# Patient Record
Sex: MALE | Race: WHITE
[De-identification: names, ages, dates, MRNs, and addresses within clinical notes are randomized per-mention and may not be internally consistent; named-entity substitution may affect disease eponyms.]

---

## 2018-08-12 ENCOUNTER — HOSPITAL ENCOUNTER (INPATIENT)
Dept: HOSPITAL 15 - ER | Age: 73
LOS: 3 days | Discharge: HOME HEALTH | DRG: 280 | End: 2018-08-15
Attending: INTERNAL MEDICINE | Admitting: INTERNAL MEDICINE
Payer: COMMERCIAL

## 2018-08-12 VITALS — DIASTOLIC BLOOD PRESSURE: 96 MMHG | SYSTOLIC BLOOD PRESSURE: 153 MMHG

## 2018-08-12 VITALS — WEIGHT: 217.16 LBS | HEIGHT: 72 IN | BODY MASS INDEX: 29.41 KG/M2

## 2018-08-12 DIAGNOSIS — Z72.0: ICD-10-CM

## 2018-08-12 DIAGNOSIS — N18.3: ICD-10-CM

## 2018-08-12 DIAGNOSIS — I21.4: ICD-10-CM

## 2018-08-12 DIAGNOSIS — Z91.19: ICD-10-CM

## 2018-08-12 DIAGNOSIS — N17.9: ICD-10-CM

## 2018-08-12 DIAGNOSIS — F10.20: ICD-10-CM

## 2018-08-12 DIAGNOSIS — I42.0: ICD-10-CM

## 2018-08-12 DIAGNOSIS — I50.43: ICD-10-CM

## 2018-08-12 DIAGNOSIS — I13.0: Primary | ICD-10-CM

## 2018-08-12 DIAGNOSIS — Z82.49: ICD-10-CM

## 2018-08-12 DIAGNOSIS — N28.1: ICD-10-CM

## 2018-08-12 DIAGNOSIS — I25.5: ICD-10-CM

## 2018-08-12 LAB
ALBUMIN SERPL-MCNC: 2.8 G/DL (ref 3.4–5)
ALP SERPL-CCNC: 84 U/L (ref 45–117)
ALT SERPL-CCNC: 33 U/L (ref 16–61)
AMYLASE SERPL-CCNC: 67 U/L (ref 25–115)
ANION GAP SERPL CALCULATED.3IONS-SCNC: 10 MMOL/L (ref 5–15)
APTT PPP: 28 SEC (ref 23.78–33.04)
BILIRUB SERPL-MCNC: 0.5 MG/DL (ref 0.2–1)
BUN SERPL-MCNC: 35 MG/DL (ref 7–18)
BUN/CREAT SERPL: 17.6
CALCIUM SERPL-MCNC: 8.2 MG/DL (ref 8.5–10.1)
CHLORIDE SERPL-SCNC: 106 MMOL/L (ref 98–107)
CO2 SERPL-SCNC: 25 MMOL/L (ref 21–32)
GLUCOSE SERPL-MCNC: 103 MG/DL (ref 74–106)
HCT VFR BLD AUTO: 40.3 % (ref 41–53)
HGB BLD-MCNC: 14 G/DL (ref 13.5–17.5)
INR PPP: 1 (ref 0.9–1.15)
LIPASE SERPL-CCNC: 62 U/L (ref 73–393)
MAGNESIUM SERPL-MCNC: 2.7 MG/DL (ref 1.6–2.6)
MCH RBC QN AUTO: 35.4 PG (ref 28–32)
MCV RBC AUTO: 102.2 FL (ref 80–100)
NRBC BLD QL AUTO: 0.1 %
POTASSIUM SERPL-SCNC: 4.2 MMOL/L (ref 3.5–5.1)
PROT SERPL-MCNC: 6.6 G/DL (ref 6.4–8.2)
PROTHROMBIN TIME: 10.7 SEC (ref 9.27–12.13)
SODIUM SERPL-SCNC: 141 MMOL/L (ref 136–145)

## 2018-08-12 PROCEDURE — 96372 THER/PROPH/DIAG INJ SC/IM: CPT

## 2018-08-12 PROCEDURE — 93306 TTE W/DOPPLER COMPLETE: CPT

## 2018-08-12 PROCEDURE — 81001 URINALYSIS AUTO W/SCOPE: CPT

## 2018-08-12 PROCEDURE — 82550 ASSAY OF CK (CPK): CPT

## 2018-08-12 PROCEDURE — 94761 N-INVAS EAR/PLS OXIMETRY MLT: CPT

## 2018-08-12 PROCEDURE — 83880 ASSAY OF NATRIURETIC PEPTIDE: CPT

## 2018-08-12 PROCEDURE — 80053 COMPREHEN METABOLIC PANEL: CPT

## 2018-08-12 PROCEDURE — 86141 C-REACTIVE PROTEIN HS: CPT

## 2018-08-12 PROCEDURE — 76775 US EXAM ABDO BACK WALL LIM: CPT

## 2018-08-12 PROCEDURE — 51702 INSERT TEMP BLADDER CATH: CPT

## 2018-08-12 PROCEDURE — 93005 ELECTROCARDIOGRAM TRACING: CPT

## 2018-08-12 PROCEDURE — 84443 ASSAY THYROID STIM HORMONE: CPT

## 2018-08-12 PROCEDURE — 83690 ASSAY OF LIPASE: CPT

## 2018-08-12 PROCEDURE — 80307 DRUG TEST PRSMV CHEM ANLYZR: CPT

## 2018-08-12 PROCEDURE — 85025 COMPLETE CBC W/AUTO DIFF WBC: CPT

## 2018-08-12 PROCEDURE — 85610 PROTHROMBIN TIME: CPT

## 2018-08-12 PROCEDURE — 71045 X-RAY EXAM CHEST 1 VIEW: CPT

## 2018-08-12 PROCEDURE — 96375 TX/PRO/DX INJ NEW DRUG ADDON: CPT

## 2018-08-12 PROCEDURE — 85730 THROMBOPLASTIN TIME PARTIAL: CPT

## 2018-08-12 PROCEDURE — 96374 THER/PROPH/DIAG INJ IV PUSH: CPT

## 2018-08-12 PROCEDURE — 85379 FIBRIN DEGRADATION QUANT: CPT

## 2018-08-12 PROCEDURE — 36415 COLL VENOUS BLD VENIPUNCTURE: CPT

## 2018-08-12 PROCEDURE — 85652 RBC SED RATE AUTOMATED: CPT

## 2018-08-12 PROCEDURE — 84484 ASSAY OF TROPONIN QUANT: CPT

## 2018-08-12 PROCEDURE — 83735 ASSAY OF MAGNESIUM: CPT

## 2018-08-12 PROCEDURE — 82150 ASSAY OF AMYLASE: CPT

## 2018-08-12 PROCEDURE — 80048 BASIC METABOLIC PNL TOTAL CA: CPT

## 2018-08-12 PROCEDURE — 80061 LIPID PANEL: CPT

## 2018-08-12 PROCEDURE — 74176 CT ABD & PELVIS W/O CONTRAST: CPT

## 2018-08-12 RX ADMIN — CARVEDILOL SCH MG: 3.12 TABLET, FILM COATED ORAL at 23:02

## 2018-08-12 RX ADMIN — ATORVASTATIN CALCIUM SCH MG: 20 TABLET, FILM COATED ORAL at 23:02

## 2018-08-12 RX ADMIN — ENOXAPARIN SODIUM SCH MG: 40 INJECTION SUBCUTANEOUS at 19:15

## 2018-08-12 RX ADMIN — SODIUM CHLORIDE SCH ML: 9 INJECTION INTRAMUSCULAR; INTRAVENOUS; SUBCUTANEOUS at 23:01

## 2018-08-13 VITALS — DIASTOLIC BLOOD PRESSURE: 95 MMHG | SYSTOLIC BLOOD PRESSURE: 155 MMHG

## 2018-08-13 VITALS — DIASTOLIC BLOOD PRESSURE: 121 MMHG | SYSTOLIC BLOOD PRESSURE: 159 MMHG

## 2018-08-13 VITALS — SYSTOLIC BLOOD PRESSURE: 135 MMHG | DIASTOLIC BLOOD PRESSURE: 56 MMHG

## 2018-08-13 VITALS — DIASTOLIC BLOOD PRESSURE: 100 MMHG | SYSTOLIC BLOOD PRESSURE: 142 MMHG

## 2018-08-13 VITALS — DIASTOLIC BLOOD PRESSURE: 73 MMHG | SYSTOLIC BLOOD PRESSURE: 118 MMHG

## 2018-08-13 VITALS — SYSTOLIC BLOOD PRESSURE: 159 MMHG | DIASTOLIC BLOOD PRESSURE: 98 MMHG

## 2018-08-13 LAB
ALBUMIN SERPL-MCNC: 2.5 G/DL (ref 3.4–5)
ALCOHOL, URINE: < 3 MG/DL (ref 0–5)
ALP SERPL-CCNC: 65 U/L (ref 45–117)
ALT SERPL-CCNC: 28 U/L (ref 16–61)
AMPHETAMINES UR QL SCN: NEGATIVE
ANION GAP SERPL CALCULATED.3IONS-SCNC: 10 MMOL/L (ref 5–15)
BARBITURATES UR QL SCN: NEGATIVE
BENZODIAZ UR QL SCN: NEGATIVE
BILIRUB SERPL-MCNC: 0.4 MG/DL (ref 0.2–1)
BUN SERPL-MCNC: 38 MG/DL (ref 7–18)
BUN/CREAT SERPL: 18.7
BZE UR QL SCN: NEGATIVE
CALCIUM SERPL-MCNC: 7.6 MG/DL (ref 8.5–10.1)
CANNABINOIDS UR QL SCN: NEGATIVE
CHLORIDE SERPL-SCNC: 106 MMOL/L (ref 98–107)
CHOLEST SERPL-MCNC: 152 MG/DL (ref ?–200)
CO2 SERPL-SCNC: 26 MMOL/L (ref 21–32)
GLUCOSE SERPL-MCNC: 95 MG/DL (ref 74–106)
HDLC SERPL-MCNC: 48 MG/DL (ref 40–59)
OPIATES UR QL SCN: NEGATIVE
PCP UR QL SCN: NEGATIVE
POTASSIUM SERPL-SCNC: 3.4 MMOL/L (ref 3.5–5.1)
PROT SERPL-MCNC: 5.8 G/DL (ref 6.4–8.2)
SODIUM SERPL-SCNC: 142 MMOL/L (ref 136–145)
TRIGL SERPL-MCNC: 99 MG/DL (ref ?–150)

## 2018-08-13 RX ADMIN — SODIUM CHLORIDE SCH ML: 9 INJECTION INTRAMUSCULAR; INTRAVENOUS; SUBCUTANEOUS at 14:42

## 2018-08-13 RX ADMIN — POTASSIUM CHLORIDE SCH MEQ: 1500 TABLET, EXTENDED RELEASE ORAL at 10:58

## 2018-08-13 RX ADMIN — SODIUM CHLORIDE SCH MG: 9 INJECTION, SOLUTION INTRAVENOUS at 10:57

## 2018-08-13 RX ADMIN — FOLIC ACID SCH MG: 1 TABLET ORAL at 15:20

## 2018-08-13 RX ADMIN — SODIUM CHLORIDE SCH ML: 9 INJECTION INTRAMUSCULAR; INTRAVENOUS; SUBCUTANEOUS at 21:24

## 2018-08-13 RX ADMIN — ENOXAPARIN SODIUM SCH MG: 40 INJECTION SUBCUTANEOUS at 10:57

## 2018-08-13 RX ADMIN — ATORVASTATIN CALCIUM SCH MG: 20 TABLET, FILM COATED ORAL at 21:25

## 2018-08-13 RX ADMIN — CARVEDILOL SCH MG: 3.12 TABLET, FILM COATED ORAL at 21:25

## 2018-08-13 RX ADMIN — FUROSEMIDE SCH MG: 10 INJECTION, SOLUTION INTRAMUSCULAR; INTRAVENOUS at 10:56

## 2018-08-13 RX ADMIN — CARVEDILOL SCH MG: 3.12 TABLET, FILM COATED ORAL at 10:59

## 2018-08-13 RX ADMIN — PANTOPRAZOLE SODIUM SCH MG: 40 TABLET, DELAYED RELEASE ORAL at 10:59

## 2018-08-13 RX ADMIN — NITROGLYCERIN SCH PATCH: 0.2 PATCH TRANSDERMAL at 10:55

## 2018-08-13 RX ADMIN — SODIUM CHLORIDE SCH ML: 9 INJECTION INTRAMUSCULAR; INTRAVENOUS; SUBCUTANEOUS at 06:10

## 2018-08-13 RX ADMIN — PROMETHAZINE HYDROCHLORIDE PRN MG: 25 INJECTION INTRAMUSCULAR; INTRAVENOUS at 21:26

## 2018-08-14 VITALS — DIASTOLIC BLOOD PRESSURE: 98 MMHG | SYSTOLIC BLOOD PRESSURE: 158 MMHG

## 2018-08-14 VITALS — SYSTOLIC BLOOD PRESSURE: 161 MMHG | DIASTOLIC BLOOD PRESSURE: 98 MMHG

## 2018-08-14 VITALS — DIASTOLIC BLOOD PRESSURE: 68 MMHG | SYSTOLIC BLOOD PRESSURE: 109 MMHG

## 2018-08-14 VITALS — DIASTOLIC BLOOD PRESSURE: 98 MMHG | SYSTOLIC BLOOD PRESSURE: 155 MMHG

## 2018-08-14 VITALS — SYSTOLIC BLOOD PRESSURE: 169 MMHG | DIASTOLIC BLOOD PRESSURE: 107 MMHG

## 2018-08-14 LAB
ANION GAP SERPL CALCULATED.3IONS-SCNC: 8 MMOL/L (ref 5–15)
BUN SERPL-MCNC: 34 MG/DL (ref 7–18)
BUN/CREAT SERPL: 18.1
CALCIUM SERPL-MCNC: 7.5 MG/DL (ref 8.5–10.1)
CHLORIDE SERPL-SCNC: 106 MMOL/L (ref 98–107)
CO2 SERPL-SCNC: 30 MMOL/L (ref 21–32)
GLUCOSE SERPL-MCNC: 83 MG/DL (ref 74–106)
HCT VFR BLD AUTO: 35.5 % (ref 41–53)
HGB BLD-MCNC: 12.3 G/DL (ref 13.5–17.5)
MAGNESIUM SERPL-MCNC: 2.2 MG/DL (ref 1.6–2.6)
MCH RBC QN AUTO: 35.5 PG (ref 28–32)
MCV RBC AUTO: 102.4 FL (ref 80–100)
NRBC BLD QL AUTO: 0.1 %
POTASSIUM SERPL-SCNC: 3.2 MMOL/L (ref 3.5–5.1)
SODIUM SERPL-SCNC: 144 MMOL/L (ref 136–145)

## 2018-08-14 RX ADMIN — SODIUM CHLORIDE SCH ML: 9 INJECTION INTRAMUSCULAR; INTRAVENOUS; SUBCUTANEOUS at 05:45

## 2018-08-14 RX ADMIN — FOLIC ACID SCH MG: 1 TABLET ORAL at 08:56

## 2018-08-14 RX ADMIN — PROMETHAZINE HYDROCHLORIDE PRN MG: 25 INJECTION INTRAMUSCULAR; INTRAVENOUS at 15:49

## 2018-08-14 RX ADMIN — FUROSEMIDE SCH MG: 10 INJECTION, SOLUTION INTRAMUSCULAR; INTRAVENOUS at 08:56

## 2018-08-14 RX ADMIN — POTASSIUM CHLORIDE SCH MEQ: 1500 TABLET, EXTENDED RELEASE ORAL at 08:56

## 2018-08-14 RX ADMIN — NITROGLYCERIN SCH PATCH: 0.2 PATCH TRANSDERMAL at 10:00

## 2018-08-14 RX ADMIN — ATORVASTATIN CALCIUM SCH MG: 20 TABLET, FILM COATED ORAL at 21:44

## 2018-08-14 RX ADMIN — SODIUM CHLORIDE SCH ML: 9 INJECTION INTRAMUSCULAR; INTRAVENOUS; SUBCUTANEOUS at 15:49

## 2018-08-14 RX ADMIN — ENOXAPARIN SODIUM SCH MG: 40 INJECTION SUBCUTANEOUS at 08:58

## 2018-08-14 RX ADMIN — SODIUM CHLORIDE SCH MG: 9 INJECTION, SOLUTION INTRAVENOUS at 08:58

## 2018-08-14 RX ADMIN — SODIUM CHLORIDE SCH ML: 9 INJECTION INTRAMUSCULAR; INTRAVENOUS; SUBCUTANEOUS at 21:45

## 2018-08-14 RX ADMIN — CARVEDILOL SCH MG: 3.12 TABLET, FILM COATED ORAL at 08:57

## 2018-08-14 RX ADMIN — PANTOPRAZOLE SODIUM SCH MG: 40 TABLET, DELAYED RELEASE ORAL at 08:57

## 2018-08-14 RX ADMIN — CARVEDILOL SCH MG: 3.12 TABLET, FILM COATED ORAL at 21:45

## 2018-08-15 VITALS — DIASTOLIC BLOOD PRESSURE: 87 MMHG | SYSTOLIC BLOOD PRESSURE: 134 MMHG

## 2018-08-15 VITALS — DIASTOLIC BLOOD PRESSURE: 104 MMHG | SYSTOLIC BLOOD PRESSURE: 168 MMHG

## 2018-08-15 VITALS — SYSTOLIC BLOOD PRESSURE: 142 MMHG | DIASTOLIC BLOOD PRESSURE: 92 MMHG

## 2018-08-15 VITALS — SYSTOLIC BLOOD PRESSURE: 143 MMHG | DIASTOLIC BLOOD PRESSURE: 79 MMHG

## 2018-08-15 VITALS — DIASTOLIC BLOOD PRESSURE: 79 MMHG | SYSTOLIC BLOOD PRESSURE: 143 MMHG

## 2018-08-15 LAB
ANION GAP SERPL CALCULATED.3IONS-SCNC: 7 MMOL/L (ref 5–15)
BUN SERPL-MCNC: 33 MG/DL (ref 7–18)
BUN/CREAT SERPL: 19.3
CALCIUM SERPL-MCNC: 7.5 MG/DL (ref 8.5–10.1)
CHLORIDE SERPL-SCNC: 104 MMOL/L (ref 98–107)
CO2 SERPL-SCNC: 31 MMOL/L (ref 21–32)
GLUCOSE SERPL-MCNC: 82 MG/DL (ref 74–106)
HCT VFR BLD AUTO: 36.3 % (ref 41–53)
HGB BLD-MCNC: 12.3 G/DL (ref 13.5–17.5)
MAGNESIUM SERPL-MCNC: 2.5 MG/DL (ref 1.6–2.6)
MCH RBC QN AUTO: 34.9 PG (ref 28–32)
MCV RBC AUTO: 103 FL (ref 80–100)
NRBC BLD QL AUTO: 0 %
POTASSIUM SERPL-SCNC: 3.4 MMOL/L (ref 3.5–5.1)
SODIUM SERPL-SCNC: 142 MMOL/L (ref 136–145)

## 2018-08-15 RX ADMIN — CARVEDILOL SCH MG: 3.12 TABLET, FILM COATED ORAL at 10:00

## 2018-08-15 RX ADMIN — FOLIC ACID SCH MG: 1 TABLET ORAL at 10:00

## 2018-08-15 RX ADMIN — SODIUM CHLORIDE SCH ML: 9 INJECTION INTRAMUSCULAR; INTRAVENOUS; SUBCUTANEOUS at 05:45

## 2018-08-15 RX ADMIN — POTASSIUM CHLORIDE SCH MEQ: 1500 TABLET, EXTENDED RELEASE ORAL at 10:00

## 2018-08-15 RX ADMIN — PANTOPRAZOLE SODIUM SCH MG: 40 TABLET, DELAYED RELEASE ORAL at 10:00

## 2018-08-15 RX ADMIN — SODIUM CHLORIDE SCH ML: 9 INJECTION INTRAMUSCULAR; INTRAVENOUS; SUBCUTANEOUS at 14:00

## 2018-08-15 RX ADMIN — NITROGLYCERIN SCH PATCH: 0.2 PATCH TRANSDERMAL at 10:00

## 2018-08-15 RX ADMIN — SODIUM CHLORIDE SCH MG: 9 INJECTION, SOLUTION INTRAVENOUS at 10:00

## 2018-08-15 RX ADMIN — FUROSEMIDE SCH MG: 10 INJECTION, SOLUTION INTRAMUSCULAR; INTRAVENOUS at 10:00

## 2018-08-15 RX ADMIN — ENOXAPARIN SODIUM SCH MG: 40 INJECTION SUBCUTANEOUS at 10:00

## 2018-09-23 ENCOUNTER — HOSPITAL ENCOUNTER (INPATIENT)
Dept: HOSPITAL 15 - ER | Age: 73
LOS: 2 days | Discharge: HOME HEALTH | DRG: 291 | End: 2018-09-25
Attending: INTERNAL MEDICINE | Admitting: NURSE PRACTITIONER
Payer: COMMERCIAL

## 2018-09-23 VITALS — BODY MASS INDEX: 29.65 KG/M2 | WEIGHT: 218.92 LBS | HEIGHT: 72 IN

## 2018-09-23 DIAGNOSIS — I50.43: ICD-10-CM

## 2018-09-23 DIAGNOSIS — N18.3: ICD-10-CM

## 2018-09-23 DIAGNOSIS — J96.01: ICD-10-CM

## 2018-09-23 DIAGNOSIS — I13.0: Primary | ICD-10-CM

## 2018-09-23 DIAGNOSIS — Z82.49: ICD-10-CM

## 2018-09-23 DIAGNOSIS — N17.9: ICD-10-CM

## 2018-09-23 DIAGNOSIS — Z91.14: ICD-10-CM

## 2018-09-23 DIAGNOSIS — Z79.899: ICD-10-CM

## 2018-09-23 DIAGNOSIS — I16.1: ICD-10-CM

## 2018-09-23 DIAGNOSIS — F51.04: ICD-10-CM

## 2018-09-23 DIAGNOSIS — F10.10: ICD-10-CM

## 2018-09-23 DIAGNOSIS — Z91.19: ICD-10-CM

## 2018-09-23 DIAGNOSIS — I42.9: ICD-10-CM

## 2018-09-23 DIAGNOSIS — E78.5: ICD-10-CM

## 2018-09-23 DIAGNOSIS — I08.3: ICD-10-CM

## 2018-09-23 DIAGNOSIS — I50.82: ICD-10-CM

## 2018-09-23 LAB
ALBUMIN SERPL-MCNC: 2.4 G/DL (ref 3.4–5)
ALP SERPL-CCNC: 75 U/L (ref 45–117)
ALT SERPL-CCNC: 14 U/L (ref 16–61)
ANION GAP SERPL CALCULATED.3IONS-SCNC: 6 MMOL/L (ref 5–15)
APTT PPP: 29.5 SEC (ref 23.78–33.04)
BILIRUB SERPL-MCNC: 0.3 MG/DL (ref 0.2–1)
BUN SERPL-MCNC: 18 MG/DL (ref 7–18)
BUN/CREAT SERPL: 11.6
CALCIUM SERPL-MCNC: 7.5 MG/DL (ref 8.5–10.1)
CHLORIDE SERPL-SCNC: 112 MMOL/L (ref 98–107)
CO2 SERPL-SCNC: 21 MMOL/L (ref 21–32)
GLUCOSE SERPL-MCNC: 111 MG/DL (ref 74–106)
HCT VFR BLD AUTO: 29.1 % (ref 41–53)
HGB BLD-MCNC: 9.9 G/DL (ref 13.5–17.5)
INR PPP: 0.97 (ref 0.9–1.15)
MCH RBC QN AUTO: 35.3 PG (ref 28–32)
MCV RBC AUTO: 103.1 FL (ref 80–100)
NRBC BLD QL AUTO: 0 %
POTASSIUM SERPL-SCNC: 3.7 MMOL/L (ref 3.5–5.1)
PROT SERPL-MCNC: 6.3 G/DL (ref 6.4–8.2)
PROTHROMBIN TIME: 10.4 SEC (ref 9.27–12.13)
SODIUM SERPL-SCNC: 139 MMOL/L (ref 136–145)

## 2018-09-23 PROCEDURE — 85025 COMPLETE CBC W/AUTO DIFF WBC: CPT

## 2018-09-23 PROCEDURE — 80053 COMPREHEN METABOLIC PANEL: CPT

## 2018-09-23 PROCEDURE — 84484 ASSAY OF TROPONIN QUANT: CPT

## 2018-09-23 PROCEDURE — 84443 ASSAY THYROID STIM HORMONE: CPT

## 2018-09-23 PROCEDURE — 81001 URINALYSIS AUTO W/SCOPE: CPT

## 2018-09-23 PROCEDURE — 96375 TX/PRO/DX INJ NEW DRUG ADDON: CPT

## 2018-09-23 PROCEDURE — 85730 THROMBOPLASTIN TIME PARTIAL: CPT

## 2018-09-23 PROCEDURE — 94640 AIRWAY INHALATION TREATMENT: CPT

## 2018-09-23 PROCEDURE — 83735 ASSAY OF MAGNESIUM: CPT

## 2018-09-23 PROCEDURE — 96374 THER/PROPH/DIAG INJ IV PUSH: CPT

## 2018-09-23 PROCEDURE — 83880 ASSAY OF NATRIURETIC PEPTIDE: CPT

## 2018-09-23 PROCEDURE — 80048 BASIC METABOLIC PNL TOTAL CA: CPT

## 2018-09-23 PROCEDURE — 85610 PROTHROMBIN TIME: CPT

## 2018-09-23 PROCEDURE — 71045 X-RAY EXAM CHEST 1 VIEW: CPT

## 2018-09-23 PROCEDURE — 93005 ELECTROCARDIOGRAM TRACING: CPT

## 2018-09-23 PROCEDURE — 36415 COLL VENOUS BLD VENIPUNCTURE: CPT

## 2018-09-24 VITALS — DIASTOLIC BLOOD PRESSURE: 85 MMHG | SYSTOLIC BLOOD PRESSURE: 119 MMHG

## 2018-09-24 VITALS — DIASTOLIC BLOOD PRESSURE: 78 MMHG | SYSTOLIC BLOOD PRESSURE: 122 MMHG

## 2018-09-24 VITALS — DIASTOLIC BLOOD PRESSURE: 80 MMHG | SYSTOLIC BLOOD PRESSURE: 136 MMHG

## 2018-09-24 VITALS — DIASTOLIC BLOOD PRESSURE: 68 MMHG | SYSTOLIC BLOOD PRESSURE: 136 MMHG

## 2018-09-24 VITALS — SYSTOLIC BLOOD PRESSURE: 136 MMHG | DIASTOLIC BLOOD PRESSURE: 88 MMHG

## 2018-09-24 VITALS — SYSTOLIC BLOOD PRESSURE: 98 MMHG | DIASTOLIC BLOOD PRESSURE: 86 MMHG

## 2018-09-24 VITALS — SYSTOLIC BLOOD PRESSURE: 161 MMHG | DIASTOLIC BLOOD PRESSURE: 98 MMHG

## 2018-09-24 LAB
ALBUMIN SERPL-MCNC: 2.3 G/DL (ref 3.4–5)
ALP SERPL-CCNC: 68 U/L (ref 45–117)
ALT SERPL-CCNC: 16 U/L (ref 16–61)
ANION GAP SERPL CALCULATED.3IONS-SCNC: 3 MMOL/L (ref 5–15)
BILIRUB SERPL-MCNC: 0.3 MG/DL (ref 0.2–1)
BUN SERPL-MCNC: 19 MG/DL (ref 7–18)
BUN/CREAT SERPL: 11.5
CALCIUM SERPL-MCNC: 7.3 MG/DL (ref 8.5–10.1)
CHLORIDE SERPL-SCNC: 111 MMOL/L (ref 98–107)
CO2 SERPL-SCNC: 24 MMOL/L (ref 21–32)
CRYSTALS UR QL AUTO: (no result) /HPF
GLUCOSE SERPL-MCNC: 82 MG/DL (ref 74–106)
HCT VFR BLD AUTO: 26.8 % (ref 41–53)
HGB BLD-MCNC: 9.1 G/DL (ref 13.5–17.5)
MCH RBC QN AUTO: 34.9 PG (ref 28–32)
MCV RBC AUTO: 103.2 FL (ref 80–100)
NRBC BLD QL AUTO: 0.1 %
POTASSIUM SERPL-SCNC: 3.9 MMOL/L (ref 3.5–5.1)
PROT SERPL-MCNC: 5.8 G/DL (ref 6.4–8.2)
SODIUM SERPL-SCNC: 138 MMOL/L (ref 136–145)

## 2018-09-24 RX ADMIN — IPRATROPIUM BROMIDE SCH MG: 0.5 SOLUTION RESPIRATORY (INHALATION) at 18:38

## 2018-09-24 RX ADMIN — ENOXAPARIN SODIUM SCH MG: 40 INJECTION SUBCUTANEOUS at 08:45

## 2018-09-24 RX ADMIN — CARVEDILOL SCH MG: 3.12 TABLET, FILM COATED ORAL at 08:00

## 2018-09-24 RX ADMIN — CARVEDILOL SCH MG: 3.12 TABLET, FILM COATED ORAL at 18:00

## 2018-09-24 RX ADMIN — ALBUTEROL SULFATE SCH MG: 2.5 SOLUTION RESPIRATORY (INHALATION) at 18:39

## 2018-09-24 RX ADMIN — PANTOPRAZOLE SODIUM SCH MG: 40 TABLET, DELAYED RELEASE ORAL at 06:19

## 2018-09-24 RX ADMIN — FUROSEMIDE SCH MG: 10 INJECTION, SOLUTION INTRAMUSCULAR; INTRAVENOUS at 18:00

## 2018-09-25 VITALS — DIASTOLIC BLOOD PRESSURE: 88 MMHG | SYSTOLIC BLOOD PRESSURE: 146 MMHG

## 2018-09-25 VITALS — SYSTOLIC BLOOD PRESSURE: 151 MMHG | DIASTOLIC BLOOD PRESSURE: 86 MMHG

## 2018-09-25 VITALS — DIASTOLIC BLOOD PRESSURE: 90 MMHG | SYSTOLIC BLOOD PRESSURE: 147 MMHG

## 2018-09-25 VITALS — DIASTOLIC BLOOD PRESSURE: 75 MMHG | SYSTOLIC BLOOD PRESSURE: 136 MMHG

## 2018-09-25 VITALS — SYSTOLIC BLOOD PRESSURE: 147 MMHG | DIASTOLIC BLOOD PRESSURE: 90 MMHG

## 2018-09-25 LAB
ANION GAP SERPL CALCULATED.3IONS-SCNC: 10 MMOL/L (ref 5–15)
APTT PPP: 29.2 SEC (ref 23.78–33.04)
BUN SERPL-MCNC: 20 MG/DL (ref 7–18)
BUN/CREAT SERPL: 12.3
CALCIUM SERPL-MCNC: 8 MG/DL (ref 8.5–10.1)
CHLORIDE SERPL-SCNC: 107 MMOL/L (ref 98–107)
CO2 SERPL-SCNC: 24 MMOL/L (ref 21–32)
GLUCOSE SERPL-MCNC: 84 MG/DL (ref 74–106)
HCT VFR BLD AUTO: 28.6 % (ref 41–53)
HGB BLD-MCNC: 9.7 G/DL (ref 13.5–17.5)
INR PPP: 0.96 (ref 0.9–1.15)
MAGNESIUM SERPL-MCNC: 2.1 MG/DL (ref 1.6–2.6)
MCH RBC QN AUTO: 34.8 PG (ref 28–32)
MCV RBC AUTO: 102.5 FL (ref 80–100)
NRBC BLD QL AUTO: 0 %
POTASSIUM SERPL-SCNC: 3.3 MMOL/L (ref 3.5–5.1)
PROTHROMBIN TIME: 10.3 SEC (ref 9.27–12.13)
SODIUM SERPL-SCNC: 141 MMOL/L (ref 136–145)

## 2018-09-25 RX ADMIN — IPRATROPIUM BROMIDE SCH MG: 0.5 SOLUTION RESPIRATORY (INHALATION) at 11:44

## 2018-09-25 RX ADMIN — IPRATROPIUM BROMIDE SCH MG: 0.5 SOLUTION RESPIRATORY (INHALATION) at 06:52

## 2018-09-25 RX ADMIN — FUROSEMIDE SCH MG: 10 INJECTION, SOLUTION INTRAMUSCULAR; INTRAVENOUS at 06:21

## 2018-09-25 RX ADMIN — ALBUTEROL SULFATE SCH MG: 2.5 SOLUTION RESPIRATORY (INHALATION) at 00:28

## 2018-09-25 RX ADMIN — ALBUTEROL SULFATE SCH MG: 2.5 SOLUTION RESPIRATORY (INHALATION) at 00:00

## 2018-09-25 RX ADMIN — ENOXAPARIN SODIUM SCH MG: 40 INJECTION SUBCUTANEOUS at 09:14

## 2018-09-25 RX ADMIN — ALBUTEROL SULFATE SCH MG: 2.5 SOLUTION RESPIRATORY (INHALATION) at 06:52

## 2018-09-25 RX ADMIN — IPRATROPIUM BROMIDE SCH MG: 0.5 SOLUTION RESPIRATORY (INHALATION) at 00:28

## 2018-09-25 RX ADMIN — IPRATROPIUM BROMIDE SCH MG: 0.5 SOLUTION RESPIRATORY (INHALATION) at 00:00

## 2018-09-25 RX ADMIN — CARVEDILOL SCH MG: 3.12 TABLET, FILM COATED ORAL at 09:15

## 2018-09-25 RX ADMIN — ALBUTEROL SULFATE SCH MG: 2.5 SOLUTION RESPIRATORY (INHALATION) at 11:44

## 2018-09-25 RX ADMIN — PANTOPRAZOLE SODIUM SCH MG: 40 TABLET, DELAYED RELEASE ORAL at 06:20

## 2018-10-31 ENCOUNTER — HOSPITAL ENCOUNTER (INPATIENT)
Dept: HOSPITAL 15 - ER | Age: 73
LOS: 2 days | Discharge: HOME HEALTH | DRG: 291 | End: 2018-11-02
Attending: INTERNAL MEDICINE | Admitting: NURSE PRACTITIONER
Payer: COMMERCIAL

## 2018-10-31 VITALS — HEIGHT: 62 IN | WEIGHT: 220.9 LBS | BODY MASS INDEX: 40.65 KG/M2

## 2018-10-31 DIAGNOSIS — J44.1: ICD-10-CM

## 2018-10-31 DIAGNOSIS — Z82.49: ICD-10-CM

## 2018-10-31 DIAGNOSIS — Z91.14: ICD-10-CM

## 2018-10-31 DIAGNOSIS — J96.01: ICD-10-CM

## 2018-10-31 DIAGNOSIS — I25.5: ICD-10-CM

## 2018-10-31 DIAGNOSIS — J84.10: ICD-10-CM

## 2018-10-31 DIAGNOSIS — I13.0: Primary | ICD-10-CM

## 2018-10-31 DIAGNOSIS — K59.00: ICD-10-CM

## 2018-10-31 DIAGNOSIS — E78.5: ICD-10-CM

## 2018-10-31 DIAGNOSIS — N18.3: ICD-10-CM

## 2018-10-31 DIAGNOSIS — Z87.891: ICD-10-CM

## 2018-10-31 DIAGNOSIS — I50.23: ICD-10-CM

## 2018-10-31 PROCEDURE — 80053 COMPREHEN METABOLIC PANEL: CPT

## 2018-10-31 PROCEDURE — 99291 CRITICAL CARE FIRST HOUR: CPT

## 2018-10-31 PROCEDURE — 94640 AIRWAY INHALATION TREATMENT: CPT

## 2018-10-31 PROCEDURE — 85025 COMPLETE CBC W/AUTO DIFF WBC: CPT

## 2018-10-31 PROCEDURE — 85027 COMPLETE CBC AUTOMATED: CPT

## 2018-10-31 PROCEDURE — 83880 ASSAY OF NATRIURETIC PEPTIDE: CPT

## 2018-10-31 PROCEDURE — 83735 ASSAY OF MAGNESIUM: CPT

## 2018-10-31 PROCEDURE — 80048 BASIC METABOLIC PNL TOTAL CA: CPT

## 2018-10-31 PROCEDURE — 96375 TX/PRO/DX INJ NEW DRUG ADDON: CPT

## 2018-10-31 PROCEDURE — 96365 THER/PROPH/DIAG IV INF INIT: CPT

## 2018-10-31 PROCEDURE — 36415 COLL VENOUS BLD VENIPUNCTURE: CPT

## 2018-10-31 PROCEDURE — 93005 ELECTROCARDIOGRAM TRACING: CPT

## 2018-10-31 PROCEDURE — 81001 URINALYSIS AUTO W/SCOPE: CPT

## 2018-10-31 PROCEDURE — 85730 THROMBOPLASTIN TIME PARTIAL: CPT

## 2018-10-31 PROCEDURE — 94761 N-INVAS EAR/PLS OXIMETRY MLT: CPT

## 2018-10-31 PROCEDURE — 85610 PROTHROMBIN TIME: CPT

## 2018-10-31 PROCEDURE — 84484 ASSAY OF TROPONIN QUANT: CPT

## 2018-10-31 PROCEDURE — 85007 BL SMEAR W/DIFF WBC COUNT: CPT

## 2018-10-31 PROCEDURE — 71045 X-RAY EXAM CHEST 1 VIEW: CPT

## 2018-11-01 VITALS — DIASTOLIC BLOOD PRESSURE: 74 MMHG | SYSTOLIC BLOOD PRESSURE: 136 MMHG

## 2018-11-01 VITALS — DIASTOLIC BLOOD PRESSURE: 86 MMHG | SYSTOLIC BLOOD PRESSURE: 154 MMHG

## 2018-11-01 VITALS — DIASTOLIC BLOOD PRESSURE: 83 MMHG | SYSTOLIC BLOOD PRESSURE: 133 MMHG

## 2018-11-01 VITALS — SYSTOLIC BLOOD PRESSURE: 147 MMHG | DIASTOLIC BLOOD PRESSURE: 69 MMHG

## 2018-11-01 VITALS — DIASTOLIC BLOOD PRESSURE: 76 MMHG | SYSTOLIC BLOOD PRESSURE: 160 MMHG

## 2018-11-01 LAB
ALBUMIN SERPL-MCNC: 2.9 G/DL (ref 3.4–5)
ALP SERPL-CCNC: 94 U/L (ref 45–117)
ALT SERPL-CCNC: 14 U/L (ref 16–61)
ANION GAP SERPL CALCULATED.3IONS-SCNC: 9 MMOL/L (ref 5–15)
APTT PPP: 28.1 SEC (ref 23.78–33.04)
BILIRUB SERPL-MCNC: 0.4 MG/DL (ref 0.2–1)
BUN SERPL-MCNC: 15 MG/DL (ref 7–18)
BUN/CREAT SERPL: 8.9
CALCIUM SERPL-MCNC: 7.6 MG/DL (ref 8.5–10.1)
CHLORIDE SERPL-SCNC: 110 MMOL/L (ref 98–107)
CO2 SERPL-SCNC: 21 MMOL/L (ref 21–32)
GLUCOSE SERPL-MCNC: 113 MG/DL (ref 74–106)
HCT VFR BLD AUTO: 33.3 % (ref 41–53)
HGB BLD-MCNC: 11 G/DL (ref 13.5–17.5)
INR PPP: 0.96 (ref 0.9–1.15)
MAGNESIUM SERPL-MCNC: 2.1 MG/DL (ref 1.6–2.6)
MCH RBC QN AUTO: 33.1 PG (ref 28–32)
MCV RBC AUTO: 99.9 FL (ref 80–100)
NRBC BLD QL AUTO: 0.1 %
POTASSIUM SERPL-SCNC: 3.7 MMOL/L (ref 3.5–5.1)
PROT SERPL-MCNC: 6.9 G/DL (ref 6.4–8.2)
PROTHROMBIN TIME: 10.3 SEC (ref 9.27–12.13)
SODIUM SERPL-SCNC: 140 MMOL/L (ref 136–145)

## 2018-11-01 RX ADMIN — IPRATROPIUM BROMIDE SCH MG: 0.5 SOLUTION RESPIRATORY (INHALATION) at 18:55

## 2018-11-01 RX ADMIN — ALBUTEROL SULFATE SCH MG: 2.5 SOLUTION RESPIRATORY (INHALATION) at 10:28

## 2018-11-01 RX ADMIN — IPRATROPIUM BROMIDE SCH MG: 0.5 SOLUTION RESPIRATORY (INHALATION) at 13:18

## 2018-11-01 RX ADMIN — ASPIRIN SCH MG: 81 TABLET ORAL at 07:27

## 2018-11-01 RX ADMIN — IPRATROPIUM BROMIDE SCH MG: 0.5 SOLUTION RESPIRATORY (INHALATION) at 10:28

## 2018-11-01 RX ADMIN — DOCUSATE SODIUM SCH MG: 100 CAPSULE, LIQUID FILLED ORAL at 18:46

## 2018-11-01 RX ADMIN — CARVEDILOL SCH MG: 3.12 TABLET, FILM COATED ORAL at 22:12

## 2018-11-01 RX ADMIN — METHYLPREDNISOLONE SODIUM SUCCINATE SCH MG: 40 INJECTION, POWDER, FOR SOLUTION INTRAMUSCULAR; INTRAVENOUS at 15:14

## 2018-11-01 RX ADMIN — METHYLPREDNISOLONE SODIUM SUCCINATE SCH MG: 40 INJECTION, POWDER, FOR SOLUTION INTRAMUSCULAR; INTRAVENOUS at 22:11

## 2018-11-01 RX ADMIN — ALBUTEROL SULFATE SCH MG: 2.5 SOLUTION RESPIRATORY (INHALATION) at 06:28

## 2018-11-01 RX ADMIN — CARVEDILOL SCH MG: 3.12 TABLET, FILM COATED ORAL at 09:39

## 2018-11-01 RX ADMIN — IPRATROPIUM BROMIDE SCH MG: 0.5 SOLUTION RESPIRATORY (INHALATION) at 06:28

## 2018-11-01 RX ADMIN — ALBUTEROL SULFATE SCH MG: 2.5 SOLUTION RESPIRATORY (INHALATION) at 13:18

## 2018-11-01 RX ADMIN — DOCUSATE SODIUM SCH MG: 100 CAPSULE, LIQUID FILLED ORAL at 22:12

## 2018-11-01 RX ADMIN — ALBUTEROL SULFATE SCH MG: 2.5 SOLUTION RESPIRATORY (INHALATION) at 18:55

## 2018-11-01 RX ADMIN — FUROSEMIDE SCH MG: 10 INJECTION, SOLUTION INTRAMUSCULAR; INTRAVENOUS at 09:38

## 2018-11-01 RX ADMIN — IPRATROPIUM BROMIDE SCH MG: 0.5 SOLUTION RESPIRATORY (INHALATION) at 22:30

## 2018-11-01 RX ADMIN — ALBUTEROL SULFATE SCH MG: 2.5 SOLUTION RESPIRATORY (INHALATION) at 22:30

## 2018-11-01 RX ADMIN — METHYLPREDNISOLONE SODIUM SUCCINATE SCH MG: 40 INJECTION, POWDER, FOR SOLUTION INTRAMUSCULAR; INTRAVENOUS at 06:21

## 2018-11-02 VITALS — SYSTOLIC BLOOD PRESSURE: 146 MMHG | DIASTOLIC BLOOD PRESSURE: 97 MMHG

## 2018-11-02 VITALS — DIASTOLIC BLOOD PRESSURE: 99 MMHG | SYSTOLIC BLOOD PRESSURE: 142 MMHG

## 2018-11-02 VITALS — DIASTOLIC BLOOD PRESSURE: 72 MMHG | SYSTOLIC BLOOD PRESSURE: 133 MMHG

## 2018-11-02 LAB
ANION GAP SERPL CALCULATED.3IONS-SCNC: 9 MMOL/L (ref 5–15)
BUN SERPL-MCNC: 33 MG/DL (ref 7–18)
BUN/CREAT SERPL: 17.8
CALCIUM SERPL-MCNC: 7.8 MG/DL (ref 8.5–10.1)
CHLORIDE SERPL-SCNC: 106 MMOL/L (ref 98–107)
CO2 SERPL-SCNC: 20 MMOL/L (ref 21–32)
GLUCOSE SERPL-MCNC: 130 MG/DL (ref 74–106)
HCT VFR BLD AUTO: 29.9 % (ref 41–53)
HGB BLD-MCNC: 10.1 G/DL (ref 13.5–17.5)
MAGNESIUM SERPL-MCNC: 2.4 MG/DL (ref 1.6–2.6)
MCH RBC QN AUTO: 33.4 PG (ref 28–32)
MCV RBC AUTO: 99 FL (ref 80–100)
POTASSIUM SERPL-SCNC: 4.1 MMOL/L (ref 3.5–5.1)
SODIUM SERPL-SCNC: 135 MMOL/L (ref 136–145)

## 2018-11-02 RX ADMIN — CARVEDILOL SCH MG: 3.12 TABLET, FILM COATED ORAL at 09:22

## 2018-11-02 RX ADMIN — ALBUTEROL SULFATE SCH MG: 2.5 SOLUTION RESPIRATORY (INHALATION) at 02:09

## 2018-11-02 RX ADMIN — ASPIRIN SCH MG: 81 TABLET ORAL at 09:21

## 2018-11-02 RX ADMIN — DOCUSATE SODIUM SCH MG: 100 CAPSULE, LIQUID FILLED ORAL at 13:51

## 2018-11-02 RX ADMIN — FUROSEMIDE SCH MG: 10 INJECTION, SOLUTION INTRAMUSCULAR; INTRAVENOUS at 09:23

## 2018-11-02 RX ADMIN — DOCUSATE SODIUM SCH MG: 100 CAPSULE, LIQUID FILLED ORAL at 05:54

## 2018-11-02 RX ADMIN — ALBUTEROL SULFATE SCH MG: 2.5 SOLUTION RESPIRATORY (INHALATION) at 14:12

## 2018-11-02 RX ADMIN — IPRATROPIUM BROMIDE SCH MG: 0.5 SOLUTION RESPIRATORY (INHALATION) at 02:09

## 2018-11-02 RX ADMIN — IPRATROPIUM BROMIDE SCH MG: 0.5 SOLUTION RESPIRATORY (INHALATION) at 14:12

## 2018-11-02 RX ADMIN — IPRATROPIUM BROMIDE SCH MG: 0.5 SOLUTION RESPIRATORY (INHALATION) at 09:43

## 2018-11-02 RX ADMIN — ALBUTEROL SULFATE SCH MG: 2.5 SOLUTION RESPIRATORY (INHALATION) at 06:00

## 2018-11-02 RX ADMIN — METHYLPREDNISOLONE SODIUM SUCCINATE SCH MG: 40 INJECTION, POWDER, FOR SOLUTION INTRAMUSCULAR; INTRAVENOUS at 09:22

## 2018-11-02 RX ADMIN — ALBUTEROL SULFATE SCH MG: 2.5 SOLUTION RESPIRATORY (INHALATION) at 09:42

## 2018-11-02 RX ADMIN — IPRATROPIUM BROMIDE SCH MG: 0.5 SOLUTION RESPIRATORY (INHALATION) at 06:00

## 2019-02-20 ENCOUNTER — HOSPITAL ENCOUNTER (INPATIENT)
Dept: HOSPITAL 15 - ER | Age: 74
LOS: 3 days | Discharge: HOME | End: 2019-02-23
Payer: COMMERCIAL

## 2019-02-20 DIAGNOSIS — I21.A1: ICD-10-CM

## 2019-02-20 DIAGNOSIS — D63.1: ICD-10-CM

## 2019-02-20 DIAGNOSIS — K64.8: ICD-10-CM

## 2019-02-20 DIAGNOSIS — I42.9: ICD-10-CM

## 2019-02-20 DIAGNOSIS — K57.30: ICD-10-CM

## 2019-02-20 DIAGNOSIS — E43: ICD-10-CM

## 2019-02-20 DIAGNOSIS — I50.23: ICD-10-CM

## 2019-02-20 DIAGNOSIS — J96.01: ICD-10-CM

## 2019-02-20 DIAGNOSIS — I13.0: Primary | ICD-10-CM

## 2019-02-20 DIAGNOSIS — N18.3: ICD-10-CM

## 2019-02-20 DIAGNOSIS — E78.5: ICD-10-CM

## 2019-02-22 PROCEDURE — 0DBL8ZX EXCISION OF TRANSVERSE COLON, VIA NATURAL OR ARTIFICIAL OPENING ENDOSCOPIC, DIAGNOSTIC: ICD-10-PCS

## 2019-02-28 ENCOUNTER — HOSPITAL ENCOUNTER (INPATIENT)
Dept: HOSPITAL 15 - ER | Age: 74
LOS: 1 days | Discharge: HOSPICE | End: 2019-03-01
Payer: COMMERCIAL

## 2019-02-28 DIAGNOSIS — I13.0: ICD-10-CM

## 2019-02-28 DIAGNOSIS — I50.43: ICD-10-CM

## 2019-02-28 DIAGNOSIS — N17.9: Primary | ICD-10-CM

## 2019-03-15 ENCOUNTER — HOSPITAL ENCOUNTER (INPATIENT)
Dept: HOSPITAL 15 - ER | Age: 74
LOS: 4 days | Discharge: HOSPICE | DRG: 698 | End: 2019-03-19
Attending: INTERNAL MEDICINE | Admitting: NURSE PRACTITIONER
Payer: COMMERCIAL

## 2019-03-15 VITALS — BODY MASS INDEX: 29.86 KG/M2 | WEIGHT: 220.46 LBS | HEIGHT: 72 IN

## 2019-03-15 DIAGNOSIS — N17.9: ICD-10-CM

## 2019-03-15 DIAGNOSIS — D64.9: ICD-10-CM

## 2019-03-15 DIAGNOSIS — M10.372: Primary | ICD-10-CM

## 2019-03-15 DIAGNOSIS — I70.0: ICD-10-CM

## 2019-03-15 DIAGNOSIS — I13.0: ICD-10-CM

## 2019-03-15 DIAGNOSIS — N18.3: ICD-10-CM

## 2019-03-15 DIAGNOSIS — J18.9: ICD-10-CM

## 2019-03-15 DIAGNOSIS — Z79.899: ICD-10-CM

## 2019-03-15 DIAGNOSIS — I08.3: ICD-10-CM

## 2019-03-15 DIAGNOSIS — D63.8: ICD-10-CM

## 2019-03-15 DIAGNOSIS — I25.2: ICD-10-CM

## 2019-03-15 DIAGNOSIS — I25.9: ICD-10-CM

## 2019-03-15 DIAGNOSIS — K21.9: ICD-10-CM

## 2019-03-15 DIAGNOSIS — I48.91: ICD-10-CM

## 2019-03-15 DIAGNOSIS — E78.5: ICD-10-CM

## 2019-03-15 DIAGNOSIS — I50.43: ICD-10-CM

## 2019-03-15 DIAGNOSIS — Z82.49: ICD-10-CM

## 2019-03-15 LAB
ALBUMIN SERPL-MCNC: 2.5 G/DL (ref 3.4–5)
ALP SERPL-CCNC: 75 U/L (ref 45–117)
ALT SERPL-CCNC: 19 U/L (ref 16–61)
ANION GAP SERPL CALCULATED.3IONS-SCNC: 8 MMOL/L (ref 5–15)
APTT PPP: 32.4 SEC (ref 23.78–33.04)
BILIRUB SERPL-MCNC: 0.4 MG/DL (ref 0.2–1)
BUN SERPL-MCNC: 54 MG/DL (ref 7–18)
BUN/CREAT SERPL: 24.9
CALCIUM SERPL-MCNC: 7.9 MG/DL (ref 8.5–10.1)
CHLORIDE SERPL-SCNC: 107 MMOL/L (ref 98–107)
CO2 SERPL-SCNC: 25 MMOL/L (ref 21–32)
GLUCOSE SERPL-MCNC: 119 MG/DL (ref 74–106)
HCT VFR BLD AUTO: 26.9 % (ref 41–53)
HGB BLD-MCNC: 8.6 G/DL (ref 13.5–17.5)
INR PPP: 1.08 (ref 0.9–1.15)
MAGNESIUM SERPL-MCNC: 2.6 MG/DL (ref 1.6–2.6)
MCH RBC QN AUTO: 27 PG (ref 28–32)
MCV RBC AUTO: 83.9 FL (ref 80–100)
NRBC BLD QL AUTO: 0.2 %
POTASSIUM SERPL-SCNC: 3.8 MMOL/L (ref 3.5–5.1)
PROT SERPL-MCNC: 7.2 G/DL (ref 6.4–8.2)
PROTHROMBIN TIME: 11.5 SEC (ref 9.27–12.13)
SODIUM SERPL-SCNC: 140 MMOL/L (ref 136–145)

## 2019-03-15 PROCEDURE — 83880 ASSAY OF NATRIURETIC PEPTIDE: CPT

## 2019-03-15 PROCEDURE — 51702 INSERT TEMP BLADDER CATH: CPT

## 2019-03-15 PROCEDURE — 86920 COMPATIBILITY TEST SPIN: CPT

## 2019-03-15 PROCEDURE — 86900 BLOOD TYPING SEROLOGIC ABO: CPT

## 2019-03-15 PROCEDURE — 97163 PT EVAL HIGH COMPLEX 45 MIN: CPT

## 2019-03-15 PROCEDURE — 96375 TX/PRO/DX INJ NEW DRUG ADDON: CPT

## 2019-03-15 PROCEDURE — 94640 AIRWAY INHALATION TREATMENT: CPT

## 2019-03-15 PROCEDURE — 87081 CULTURE SCREEN ONLY: CPT

## 2019-03-15 PROCEDURE — 93005 ELECTROCARDIOGRAM TRACING: CPT

## 2019-03-15 PROCEDURE — 73110 X-RAY EXAM OF WRIST: CPT

## 2019-03-15 PROCEDURE — 85610 PROTHROMBIN TIME: CPT

## 2019-03-15 PROCEDURE — 86901 BLOOD TYPING SEROLOGIC RH(D): CPT

## 2019-03-15 PROCEDURE — 86850 RBC ANTIBODY SCREEN: CPT

## 2019-03-15 PROCEDURE — 96365 THER/PROPH/DIAG IV INF INIT: CPT

## 2019-03-15 PROCEDURE — 80053 COMPREHEN METABOLIC PANEL: CPT

## 2019-03-15 PROCEDURE — 84550 ASSAY OF BLOOD/URIC ACID: CPT

## 2019-03-15 PROCEDURE — 94761 N-INVAS EAR/PLS OXIMETRY MLT: CPT

## 2019-03-15 PROCEDURE — 80048 BASIC METABOLIC PNL TOTAL CA: CPT

## 2019-03-15 PROCEDURE — 72040 X-RAY EXAM NECK SPINE 2-3 VW: CPT

## 2019-03-15 PROCEDURE — 84484 ASSAY OF TROPONIN QUANT: CPT

## 2019-03-15 PROCEDURE — 72100 X-RAY EXAM L-S SPINE 2/3 VWS: CPT

## 2019-03-15 PROCEDURE — 73130 X-RAY EXAM OF HAND: CPT

## 2019-03-15 PROCEDURE — 83735 ASSAY OF MAGNESIUM: CPT

## 2019-03-15 PROCEDURE — 87040 BLOOD CULTURE FOR BACTERIA: CPT

## 2019-03-15 PROCEDURE — 84443 ASSAY THYROID STIM HORMONE: CPT

## 2019-03-15 PROCEDURE — 71045 X-RAY EXAM CHEST 1 VIEW: CPT

## 2019-03-15 PROCEDURE — 83605 ASSAY OF LACTIC ACID: CPT

## 2019-03-15 PROCEDURE — 36415 COLL VENOUS BLD VENIPUNCTURE: CPT

## 2019-03-15 PROCEDURE — 81001 URINALYSIS AUTO W/SCOPE: CPT

## 2019-03-15 PROCEDURE — 93970 EXTREMITY STUDY: CPT

## 2019-03-15 PROCEDURE — 96367 TX/PROPH/DG ADDL SEQ IV INF: CPT

## 2019-03-15 PROCEDURE — 85025 COMPLETE CBC W/AUTO DIFF WBC: CPT

## 2019-03-15 PROCEDURE — 85730 THROMBOPLASTIN TIME PARTIAL: CPT

## 2019-03-15 RX ADMIN — ATORVASTATIN CALCIUM SCH MG: 20 TABLET, FILM COATED ORAL at 22:55

## 2019-03-15 RX ADMIN — APIXABAN SCH MG: 2.5 TABLET, FILM COATED ORAL at 22:55

## 2019-03-15 RX ADMIN — CARVEDILOL SCH MG: 3.12 TABLET, FILM COATED ORAL at 22:55

## 2019-03-16 VITALS — DIASTOLIC BLOOD PRESSURE: 73 MMHG | SYSTOLIC BLOOD PRESSURE: 119 MMHG

## 2019-03-16 VITALS — SYSTOLIC BLOOD PRESSURE: 118 MMHG | DIASTOLIC BLOOD PRESSURE: 75 MMHG

## 2019-03-16 VITALS — DIASTOLIC BLOOD PRESSURE: 65 MMHG | SYSTOLIC BLOOD PRESSURE: 107 MMHG

## 2019-03-16 VITALS — SYSTOLIC BLOOD PRESSURE: 120 MMHG | DIASTOLIC BLOOD PRESSURE: 65 MMHG

## 2019-03-16 VITALS — DIASTOLIC BLOOD PRESSURE: 96 MMHG | SYSTOLIC BLOOD PRESSURE: 146 MMHG

## 2019-03-16 LAB
ALBUMIN SERPL-MCNC: 2.2 G/DL (ref 3.4–5)
ALP SERPL-CCNC: 69 U/L (ref 45–117)
ALT SERPL-CCNC: 16 U/L (ref 16–61)
ANION GAP SERPL CALCULATED.3IONS-SCNC: 7 MMOL/L (ref 5–15)
BILIRUB SERPL-MCNC: 0.3 MG/DL (ref 0.2–1)
BUN SERPL-MCNC: 47 MG/DL (ref 7–18)
BUN/CREAT SERPL: 23.3
CALCIUM SERPL-MCNC: 7.8 MG/DL (ref 8.5–10.1)
CHLORIDE SERPL-SCNC: 110 MMOL/L (ref 98–107)
CO2 SERPL-SCNC: 25 MMOL/L (ref 21–32)
GLUCOSE SERPL-MCNC: 129 MG/DL (ref 74–106)
HCT VFR BLD AUTO: 25.2 % (ref 41–53)
HGB BLD-MCNC: 8 G/DL (ref 13.5–17.5)
MCH RBC QN AUTO: 26.6 PG (ref 28–32)
MCV RBC AUTO: 83.8 FL (ref 80–100)
NRBC BLD QL AUTO: 0.1 %
POTASSIUM SERPL-SCNC: 3.5 MMOL/L (ref 3.5–5.1)
PROT SERPL-MCNC: 6.5 G/DL (ref 6.4–8.2)
SODIUM SERPL-SCNC: 142 MMOL/L (ref 136–145)

## 2019-03-16 RX ADMIN — CARVEDILOL SCH MG: 3.12 TABLET, FILM COATED ORAL at 21:56

## 2019-03-16 RX ADMIN — MORPHINE SULFATE PRN MG: 2 INJECTION, SOLUTION INTRAMUSCULAR; INTRAVENOUS at 11:47

## 2019-03-16 RX ADMIN — FUROSEMIDE SCH MG: 10 INJECTION, SOLUTION INTRAMUSCULAR; INTRAVENOUS at 06:24

## 2019-03-16 RX ADMIN — APIXABAN SCH MG: 2.5 TABLET, FILM COATED ORAL at 21:56

## 2019-03-16 RX ADMIN — FUROSEMIDE SCH MG: 10 INJECTION, SOLUTION INTRAMUSCULAR; INTRAVENOUS at 18:02

## 2019-03-16 RX ADMIN — APIXABAN SCH MG: 2.5 TABLET, FILM COATED ORAL at 09:42

## 2019-03-16 RX ADMIN — PANTOPRAZOLE SODIUM SCH MG: 40 TABLET, DELAYED RELEASE ORAL at 09:42

## 2019-03-16 RX ADMIN — TEMAZEPAM PRN MG: 15 CAPSULE ORAL at 21:56

## 2019-03-16 RX ADMIN — ATORVASTATIN CALCIUM SCH MG: 20 TABLET, FILM COATED ORAL at 21:55

## 2019-03-16 RX ADMIN — MORPHINE SULFATE PRN MG: 2 INJECTION, SOLUTION INTRAMUSCULAR; INTRAVENOUS at 21:56

## 2019-03-16 RX ADMIN — CARVEDILOL SCH MG: 3.12 TABLET, FILM COATED ORAL at 09:43

## 2019-03-16 NOTE — NUR
ASSESSMENT NOTE

PT ARRIVED FROM ER BY MIRA, ALERT ORIENTED X4, PT WAS RESTING IN GURNEY IN LOW ARITA 
POSITION, LEFT LEG WAS RESTING ON A PILLOW, PT STAR SCREAMING WHEN HIS LEFT LOWER LEG GOT 
TOUCHED, ALSO I NOTICE HIS RIGHT ARM IS VERY WEEK AND TENDER TO TOUCH, LEFT ARM IS VERY 
WEEK, AND PT HAS NO PROBLEM, PT ASSISTED WITH MAX ASSIST TO GET INSIDE HIS BED, PT 
REPOSITIONED CAREFULLY IN LOW ARITA POSITION, OXYGEN 2 L NC, CALL LIGHT WITHIN RAECH.

## 2019-03-16 NOTE — NUR
LUNCH

PT WAS ABLE TO FEED HIM SELF, PT IS MOVING HIS UPPER AND LOWER EXTREMITIES MORE COMPARING 
WHEN HE ARRIVED IN AM.

## 2019-03-16 NOTE — NUR
Opening Shift Note



Assumed care of patient, awake and alert.  No S/S of distress/SOB or pain.  Instructed on 
POC and to call for assistance PRN, will continue to monitor for changes Q1hr and PRN.

## 2019-03-16 NOTE — NUR
ASSESSED PATIENT FOR PRN MED NEB. PATIENT RA WITH A SPO2 OF 97%, HR 85, RR 18, BILATERAL 
COARSE BREATH SOUNDS. NO WHEEZES NOTED. PATIENT WAS INSTRUCTED TO COUGH. PATIENT 
DEMONSTRATED GOOD COUGHING EFFORT. PATIENT IN NO RESPIRATORY DISTRESS. NO SOB NOTED. WILL 
CONTINUE TO MONITOR PATIENT.

## 2019-03-17 VITALS — SYSTOLIC BLOOD PRESSURE: 112 MMHG | DIASTOLIC BLOOD PRESSURE: 66 MMHG

## 2019-03-17 VITALS — DIASTOLIC BLOOD PRESSURE: 75 MMHG | SYSTOLIC BLOOD PRESSURE: 120 MMHG

## 2019-03-17 VITALS — SYSTOLIC BLOOD PRESSURE: 151 MMHG | DIASTOLIC BLOOD PRESSURE: 93 MMHG

## 2019-03-17 VITALS — DIASTOLIC BLOOD PRESSURE: 41 MMHG | SYSTOLIC BLOOD PRESSURE: 105 MMHG

## 2019-03-17 VITALS — DIASTOLIC BLOOD PRESSURE: 86 MMHG | SYSTOLIC BLOOD PRESSURE: 150 MMHG

## 2019-03-17 LAB
ANION GAP SERPL CALCULATED.3IONS-SCNC: 7 MMOL/L (ref 5–15)
BUN SERPL-MCNC: 50 MG/DL (ref 7–18)
BUN/CREAT SERPL: 22.9
CALCIUM SERPL-MCNC: 7.9 MG/DL (ref 8.5–10.1)
CHLORIDE SERPL-SCNC: 110 MMOL/L (ref 98–107)
CO2 SERPL-SCNC: 24 MMOL/L (ref 21–32)
GLUCOSE SERPL-MCNC: 134 MG/DL (ref 74–106)
HCT VFR BLD AUTO: 23.7 % (ref 41–53)
HGB BLD-MCNC: 7.4 G/DL (ref 13.5–17.5)
MCH RBC QN AUTO: 26.1 PG (ref 28–32)
MCV RBC AUTO: 83.6 FL (ref 80–100)
NRBC BLD QL AUTO: 0.1 %
POTASSIUM SERPL-SCNC: 4.2 MMOL/L (ref 3.5–5.1)
SODIUM SERPL-SCNC: 141 MMOL/L (ref 136–145)

## 2019-03-17 RX ADMIN — FUROSEMIDE SCH MG: 20 TABLET ORAL at 13:45

## 2019-03-17 RX ADMIN — ATORVASTATIN CALCIUM SCH MG: 20 TABLET, FILM COATED ORAL at 21:40

## 2019-03-17 RX ADMIN — CARVEDILOL SCH MG: 3.12 TABLET, FILM COATED ORAL at 09:35

## 2019-03-17 RX ADMIN — TEMAZEPAM PRN MG: 15 CAPSULE ORAL at 21:41

## 2019-03-17 RX ADMIN — CARVEDILOL SCH MG: 3.12 TABLET, FILM COATED ORAL at 21:41

## 2019-03-17 RX ADMIN — MORPHINE SULFATE PRN MG: 2 INJECTION, SOLUTION INTRAMUSCULAR; INTRAVENOUS at 21:42

## 2019-03-17 RX ADMIN — APIXABAN SCH MG: 2.5 TABLET, FILM COATED ORAL at 21:42

## 2019-03-17 RX ADMIN — PANTOPRAZOLE SODIUM SCH MG: 40 TABLET, DELAYED RELEASE ORAL at 09:35

## 2019-03-17 RX ADMIN — APIXABAN SCH MG: 2.5 TABLET, FILM COATED ORAL at 09:34

## 2019-03-17 RX ADMIN — FUROSEMIDE SCH MG: 20 TABLET ORAL at 21:42

## 2019-03-17 NOTE — NUR
DR AMBROSE

CALLED BACK, MADE AWARE THAT PT IS MUCH BETTER, DR AMBROSE WILL COME LATER ON TONIGHT TO PUT 
NE WORDERS.

## 2019-03-17 NOTE — NUR
PT HAS A LARGE SOFT BM

WAS ABLE TO WASH HIS HAND GENTLY. ASSISTED TO AMBULATE BACK, AND SIT ON CHAIR AT BED SIDE.

## 2019-03-17 NOTE — NUR
ASSESSMENT NOTE

PT IS ALERT ORIENTED X4, RESTING IN BED COMFORTABLY, NO DISTRESS NOTED, PT IS ABLE TO MOVE 
RT ARM MORE FREELY COMPARING YESTERDAY, RT HAND IS PUFFY, DENIES PAIN AT THIS TIME 0/10, 
CALL LIGHT WITHIN REACH.

## 2019-03-17 NOTE — NUR
ASSESSED  FOR PRN MED NEB, PT ON 2L NC WITH SPO2 96%, HR77, RR 16 BS CLEAR/DIMINISHED. PT IN 
NO DISTRESS NO SOB NOTED. WILL CONTINUE TO MONITOR PT.

## 2019-03-17 NOTE — NUR
PRN MED NEB ASSESSMENT. NO DISTRESS NOTED AT THIS TIME. PT DENIES SOB. POX 96% ON 2L NC HR 
88 RR 18. BS CLEAR AND DIMINISHED. TX NOT GIVEN.

## 2019-03-17 NOTE — NUR
DR MARTINES

INFORM ME THAT HE ALREADY CONTACTED DR AMBROSE, AND HE WILL BE HERE, AND IF HE DID NOT CALL 
HIM BY 4 PM, TO GIVE HIM A CALL.

## 2019-03-17 NOTE — NUR
Dr. Tirado at bedside



Dr. Tirado is at bedside, states patient is ok to be discharged from Rheumatology 
perspective.

## 2019-03-17 NOTE — NUR
BM

PT WAS ABLE TO AMBULATE WITH A MINIMUM ASSIST TO BATHROOM, EDUCATED HOW TO USE THE CALL 
LIGHT AFTER HE DONE, VERBALIS UNDERSTANDING.

## 2019-03-17 NOTE — NUR
CALLED DR AMBROSE

TO VERIFY THE COLCHICINE FREQUENCY, A BRIEF MESSAGE LEFT ON THE ANSWER MACHINE TO CALL US 
BACK.

## 2019-03-18 VITALS — DIASTOLIC BLOOD PRESSURE: 72 MMHG | SYSTOLIC BLOOD PRESSURE: 117 MMHG

## 2019-03-18 VITALS — DIASTOLIC BLOOD PRESSURE: 70 MMHG | SYSTOLIC BLOOD PRESSURE: 134 MMHG

## 2019-03-18 VITALS — SYSTOLIC BLOOD PRESSURE: 123 MMHG | DIASTOLIC BLOOD PRESSURE: 80 MMHG

## 2019-03-18 VITALS — DIASTOLIC BLOOD PRESSURE: 67 MMHG | SYSTOLIC BLOOD PRESSURE: 133 MMHG

## 2019-03-18 VITALS — SYSTOLIC BLOOD PRESSURE: 117 MMHG | DIASTOLIC BLOOD PRESSURE: 72 MMHG

## 2019-03-18 VITALS — DIASTOLIC BLOOD PRESSURE: 71 MMHG | SYSTOLIC BLOOD PRESSURE: 111 MMHG

## 2019-03-18 LAB
ANION GAP SERPL CALCULATED.3IONS-SCNC: 7 MMOL/L (ref 5–15)
BUN SERPL-MCNC: 57 MG/DL (ref 7–18)
BUN/CREAT SERPL: 28.5
CALCIUM SERPL-MCNC: 7.8 MG/DL (ref 8.5–10.1)
CHLORIDE SERPL-SCNC: 107 MMOL/L (ref 98–107)
CO2 SERPL-SCNC: 26 MMOL/L (ref 21–32)
GLUCOSE SERPL-MCNC: 81 MG/DL (ref 74–106)
HCT VFR BLD AUTO: 21.6 % (ref 41–53)
HGB BLD-MCNC: 6.9 G/DL (ref 13.5–17.5)
MCH RBC QN AUTO: 26.6 PG (ref 28–32)
MCV RBC AUTO: 83 FL (ref 80–100)
NRBC BLD QL AUTO: 0 %
POTASSIUM SERPL-SCNC: 4 MMOL/L (ref 3.5–5.1)
SODIUM SERPL-SCNC: 140 MMOL/L (ref 136–145)

## 2019-03-18 RX ADMIN — ATORVASTATIN CALCIUM SCH MG: 20 TABLET, FILM COATED ORAL at 21:32

## 2019-03-18 RX ADMIN — FUROSEMIDE SCH MG: 20 TABLET ORAL at 21:32

## 2019-03-18 RX ADMIN — FUROSEMIDE SCH MG: 20 TABLET ORAL at 14:40

## 2019-03-18 RX ADMIN — FUROSEMIDE SCH MG: 20 TABLET ORAL at 05:48

## 2019-03-18 RX ADMIN — CARVEDILOL SCH MG: 3.12 TABLET, FILM COATED ORAL at 21:32

## 2019-03-18 RX ADMIN — TEMAZEPAM PRN MG: 15 CAPSULE ORAL at 21:42

## 2019-03-18 RX ADMIN — CARVEDILOL SCH MG: 3.12 TABLET, FILM COATED ORAL at 10:11

## 2019-03-18 RX ADMIN — PANTOPRAZOLE SODIUM SCH MG: 40 TABLET, DELAYED RELEASE ORAL at 10:11

## 2019-03-18 RX ADMIN — LEVOFLOXACIN SCH MG: 250 TABLET, FILM COATED ORAL at 10:09

## 2019-03-18 RX ADMIN — APIXABAN SCH MG: 2.5 TABLET, FILM COATED ORAL at 10:00

## 2019-03-18 RX ADMIN — APIXABAN SCH MG: 2.5 TABLET, FILM COATED ORAL at 21:32

## 2019-03-18 NOTE — NUR
Respiratory note:

at bedside to assess for prn tx. tx not indicated at this time. bs are clear diminished. pt 
seems agitated asked how he felt. pt stated eagle raised upset voice "I will be better once 
everyone leaves me alone for tonight and lets me sleep" explained to pt i understand and 
will just be witting my name on the board and leaving. RT name written on pts room board.

## 2019-03-18 NOTE — NUR
Opening Shift Note

Assumed care of patient, patient is resting with eyes closed.  No S/S of distress/SOB or 
pain.  Instructed on POC and to call for assist PRN, will continue to monitor. Bed locked in 
the lowest position. Bed rails up x2. Call light in reach.

## 2019-03-18 NOTE — NUR
MD AWARE

DR. MANINDER ALVARADO AWARE OF HGB OF 6.9. NEW ORDERS RECEIVED. ORDERS READ BACK AND VERIFIED.

-------------------------------------------------------------------------------

Addendum: 03/18/19 at 1119 by Leena Wilkes RN

-------------------------------------------------------------------------------

MD AWARE THAT 1000 DOSE OF ELIQUIS WAS HELD. PER MD, OK TO HOLD 1000 DOSE TODAY.

## 2019-03-18 NOTE — NUR
RECIEVED CALL FROM MD

PER DR. WALTER ALVARADO, CANCEL ORDER FOR BLOOD TRANSFUSION AND LASIX IV. PATIENT WILL BE 
DISCHARGED TO HOSPICE.

## 2019-03-18 NOTE — NUR
CRITICAL LAB

RECEIVED CALL FROM WILIAM IN LAB WITH CRITICAL HGB OF 6.9. JAREK WAN. LEFT MESSAGE. AWAITING 
CALL BACK.

## 2019-03-18 NOTE — NUR
Opening Shift Note

Assumed care of patient, awake and alert. No S/S of distress/SOB or pain. Bed locked in 
lowest position, side rails upx2, call light within reach. Instructed on POC and to call for 
assist PRN, will continue to monitor for changes Q1hr and PRN.

## 2019-03-18 NOTE — NUR
Respiratory note:

ASSESSED PATIENT FOR PRN BREATHING TX. PATIENT WAS AWAKE AND ALERT, NO RESPIRATORY DISTRESS 
NOTED OR STATED. PATIENT BREATH SOUNDS ARE CLEAR AND DIMINISHED, HR 85, SPO2 97% ON 2 L 
NASAL CANNULA, RR 18. PATIENT WAS INFORMED TO HAVE RT PAGED IF BREATHING TX IS NEEDED. WILL 
CONTINUE TO MONITOR AND NOTIFY RN.

## 2019-03-18 NOTE — NUR
CLOSING NOTE

Patient is awake and alert.  No S/S of distress/SOB or pain.  Bed locked in the lowest 
position. Bed rails up x2. Call light in reach. Endorsed care to night nurse.

## 2019-03-18 NOTE — NUR
HOSPICE

PER HOSPICE RN GREER, PATIENT HAS BEEN ACCEPTED BY [place] AND WILL BE SET UP 
TOMORROW FOR DISCHARGE. CALLED MD AND LEFT MESSAGE. AWAITING CALL BACK.

## 2019-03-18 NOTE — NUR
[facility] 

SPOKE WITH DANIELA FROM [facility]. PER DANIELA, TRANSPORT WILL BE SET UP TOMORROW.

## 2019-03-18 NOTE — NUR
[facility] at bedside, providing education and information to patient. Obtained consent 
from patient, and [facility] representative was provided with family information: Aftab Christianson [phone], representative verbalized that she will educate family on hospice and 
provide further information on their services.

## 2019-03-19 VITALS — DIASTOLIC BLOOD PRESSURE: 58 MMHG | SYSTOLIC BLOOD PRESSURE: 112 MMHG

## 2019-03-19 VITALS — DIASTOLIC BLOOD PRESSURE: 83 MMHG | SYSTOLIC BLOOD PRESSURE: 132 MMHG

## 2019-03-19 RX ADMIN — LEVOFLOXACIN SCH MG: 250 TABLET, FILM COATED ORAL at 09:43

## 2019-03-19 RX ADMIN — FUROSEMIDE SCH MG: 20 TABLET ORAL at 05:53

## 2019-03-19 RX ADMIN — PANTOPRAZOLE SODIUM SCH MG: 40 TABLET, DELAYED RELEASE ORAL at 09:43

## 2019-03-19 RX ADMIN — APIXABAN SCH MG: 2.5 TABLET, FILM COATED ORAL at 09:44

## 2019-03-19 RX ADMIN — CARVEDILOL SCH MG: 3.12 TABLET, FILM COATED ORAL at 09:45

## 2019-03-19 NOTE — NUR
Respiratory note:

ASSESSED PT FOR PRN TX PT WAS AWAKE AND ALERT SITTING AT THE EDGE OF BED. HR 76, RR 16, SPO2 
100% ON ROOM AIR. BS ARE CLEAR NO INDICATION FOR TX AT THIS TIME. PT KNOWS TO HAVE RT PAGED 
IF TX IS NEEDED.

## 2019-03-19 NOTE — NUR
DISCHARGE

PT PICKED UP BY TRANSPORTATION SET UP BY [facility]. PT AWAKE A&O AND VERBALIZED 
UNDERSTANDING OF TRANSPORTATION AND NEED TO MAKE FOLLOW UP DR STEIN WHEN HOME. DR CONTACT 
INFORMATION GIVEN AND PT VERBALIZED UNDERSTANDING. PT SON STEVEN CALLED AND MADE AWARE OD 
TRANSPORTATION HOME. 

ESCORTED OUT OF FACILITY BY WC WITH NO S/S OF DISTRESS

## 2019-03-19 NOTE — NUR
OPEN

PT SITTING ON EDGE OF BED, AWAKE A&O WITH NO C/O PAIN

PT INDEPENDENTLY EATING  FROM BREAKFAST AMOS SON REMAINS IN PLACE

PT STATES HES GOING HOME ON HOSPICE TODAY

NO S/S OF DISTRESS OR DISCOMFORT

WILL CONTINUE TO MONITOR

## 2019-03-19 NOTE — NUR
IV AND TELE REMOVED

IV TO RIGHT ARM REMOVED WITH CATHETER INTACT

SON REMOVED AFTER REMOVING 700ML CLEAR YELLOW URINE, PT GIVEN URINAL AND INSTRUCTED TO 
NOTIFY STAFF WHEN ABLE TO URINATE, VERBALIZED UNDERSTANDING. 

-------------------------------------------------------------------------------

Addendum: 03/19/19 at 0958 by Gi Hollis RN RN

-------------------------------------------------------------------------------

TELE 19 RETURNED TO [facility]EL